# Patient Record
Sex: MALE | HISPANIC OR LATINO | Employment: FULL TIME | URBAN - METROPOLITAN AREA
[De-identification: names, ages, dates, MRNs, and addresses within clinical notes are randomized per-mention and may not be internally consistent; named-entity substitution may affect disease eponyms.]

---

## 2022-01-03 ENCOUNTER — TRANSCRIBE ORDERS (OUTPATIENT)
Dept: PHYSICAL THERAPY | Facility: CLINIC | Age: 47
End: 2022-01-03

## 2022-01-03 ENCOUNTER — TREATMENT (OUTPATIENT)
Dept: PHYSICAL THERAPY | Facility: CLINIC | Age: 47
End: 2022-01-03

## 2022-01-03 DIAGNOSIS — S63.408A TRAUMATIC RUPTURE OF FLEXOR SHEATH PULLEY OF FINGER: ICD-10-CM

## 2022-01-03 DIAGNOSIS — S66.912A STRAIN OF LEFT HAND, INITIAL ENCOUNTER: ICD-10-CM

## 2022-01-03 DIAGNOSIS — S63.408A TRAUMATIC RUPTURE OF FLEXOR SHEATH PULLEY OF FINGER: Primary | ICD-10-CM

## 2022-01-03 PROCEDURE — L3927 FO PIP DIP NO JT SPR PRE OTS: HCPCS | Performed by: PHYSICAL THERAPIST

## 2022-01-03 NOTE — PROGRESS NOTES
Federico Christensen 1975   Diagnosis/ Surgery: left long finger A2 pulley rupture              Date Of Onset: 10/22/2021    Date Of Surgery:N/A    Hand Dominance: Left  History of Present Condition: Work related injury. Plumbing at the time of injury, tightening a pipe, heard and felt a pop in left hand long finger area.  Now hand hurts when using it, otherwise Ok  Medical/Vocational History/ Medications: PMH: See MD report in Media file.  Works at [employer]    Pain: 5/10 when using hand    Edema: mild  Sensibility: WNL   Wound Status:N/A   ROM/ Strength/Test: WFL    Splinting:  · Patient was measure and fit with a custom Pre-fabricated size large impact glove with padding to P1 area of fingers  · Patient was instructed in wearing schedule, precautions and care of the splint during this visit.   · Patient was instructed in proper donning/doffing of splint.   Assessment:  · Patient was fitted and appropriate splint was fabricated this date.  · Patient reported that splint was comfortable and had no complications with the fit of the splint.  · Patient was instructed and patient verbalized understanding of precautions, wear and care of the splint.   · Patient demonstrated independent donning/doffing of splint during treatment today.  Goals:  · Patient was fitted properly with appropriate splint for diagnosis  · Patient was educated on precautions, wear schedule and care of splint  · Patient demonstrated independence with donning/doffing of the splint.  · Splint was provided to Protect Healing Structures, Restrict Mobility, Improve joint alignment.  Plan:  · No additional treatment is required for this patient at this time. The patient is therefore discharged from therapy.  · Patient advised to contact therapist with any additional questions or concerns regarding the fit and function of the splint.  · Patient will be seen for splint issues as needed   · Wear Instructions: Off for hygiene       PT SIGNATURE: Rafi  CHRISTINA Aranda PT, T  License Number: [id number]  Electronically signed by Rafi Aranda, PT, 01/03/22, 1:58 PM EST

## 2024-05-31 ENCOUNTER — PATIENT ROUNDING (BHMG ONLY) (OUTPATIENT)
Dept: URGENT CARE | Facility: CLINIC | Age: 49
End: 2024-05-31
Payer: MEDICAID